# Patient Record
(demographics unavailable — no encounter records)

---

## 2019-06-13 NOTE — PATHOLOGY
[facility] Accession Number: [id number]

.                                                                01

Material submitted:                                        .

esophagus - DISTAL ESOPHAGEAL BIOPSIES. Modifiers: distal

.                                                                01

Clinician provided ICD-10:

K21.9

Z12.11

.                                                                01

Clinical history:                                          .

GERD, CRC screen

.                                                                02

**********************************************************************

Diagnosis:

Esophageal biopsies, distal esophagus:

- Segments of hyperplastic squamous esophageal mucosa showing chronic

inflammation and focally increased intraepithelial eosinophils.

.

(JPM:mml; 06/13/2019)

[facility]/06/13/2019

**********************************************************************

.                                                                02

Comment:

Sections of the distal esophageal biopsy reveal segments of hyperplastic

squamous esophageal mucosa showing focal underlying chronic inflammation.

There are focally increased intraepithelial eosinophils.  The differential

diagnosis of esophagitis with eosinophils includes reflux esophagitis,

"pill esophagitis", and eosinophilic esophagitis.  The number of

intraepithelial eosinophils does not exceed 20 eosinophils / HPF.  The

findings are consistent with reflux esophagitis.  Correlate clinically.

.

(JPM:mml; 06/13/2019)

.                                                                02

Electronically signed:                                     .

Jorge Maldonado MD, Pathologist

NPI- [id number]

.                                                                01

Gross description:                                         .

The specimen is received in formalin, labeled "Ilir Lord, distal

esophageal biopsies" and consists of multiple fragments of pink-tan tissue

measuring 0.9 x 0.8 x 0.2 cm in aggregate which are entirely submitted in

A1.

(SDY; 6/12/2019)

SYU/SYU

.                                                                02

Pathologist provided ICD-10:

K20.9

.                                                                02

CPT                                                        .

122221

Specimen Comment: A courtesy copy of this report has been sent to

Specimen Comment: [phone], [phone].

Specimen Comment: Report sent to  / DR SINGH

***Performed at:  [address] [place] [address] [facility] [address], [place], [place]  [id number]

   MD Stevan Krishnan MD Phone:  [phone]

***Performed at:  02

   [address]  [id number]

   MD Jorge Maldonado MD Phone:  [phone]